# Patient Record
Sex: MALE | Race: BLACK OR AFRICAN AMERICAN | NOT HISPANIC OR LATINO | Employment: OTHER | URBAN - METROPOLITAN AREA
[De-identification: names, ages, dates, MRNs, and addresses within clinical notes are randomized per-mention and may not be internally consistent; named-entity substitution may affect disease eponyms.]

---

## 2017-01-09 ENCOUNTER — PATIENT OUTREACH (OUTPATIENT)
Dept: ADMINISTRATIVE | Facility: HOSPITAL | Age: 77
End: 2017-01-09

## 2017-01-09 NOTE — PROGRESS NOTES
HTN registry, over 1 year.  Called pt to inform:    Due for an office visit with her, your cholesterol labs, colon cancer screening, and possibly some immunizations (flu, pneumonia, shingles, and tetanus)    Last ov Spring 10/2015    3rd attempt    Spoke with caregiver, pt is no longer seeing Dr. Londono - moved to [place].